# Patient Record
Sex: FEMALE | URBAN - METROPOLITAN AREA
[De-identification: names, ages, dates, MRNs, and addresses within clinical notes are randomized per-mention and may not be internally consistent; named-entity substitution may affect disease eponyms.]

---

## 2021-08-20 ENCOUNTER — APPOINTMENT (RX ONLY)
Dept: URBAN - METROPOLITAN AREA CLINIC 41 | Facility: CLINIC | Age: 61
Setting detail: DERMATOLOGY
End: 2021-08-20

## 2021-08-20 DIAGNOSIS — D22 MELANOCYTIC NEVI: ICD-10-CM

## 2021-08-20 DIAGNOSIS — L57.0 ACTINIC KERATOSIS: ICD-10-CM

## 2021-08-20 PROBLEM — D23.72 OTHER BENIGN NEOPLASM OF SKIN OF LEFT LOWER LIMB, INCLUDING HIP: Status: ACTIVE | Noted: 2021-08-20

## 2021-08-20 PROBLEM — D22.5 MELANOCYTIC NEVI OF TRUNK: Status: ACTIVE | Noted: 2021-08-20

## 2021-08-20 PROCEDURE — 17003 DESTRUCT PREMALG LES 2-14: CPT

## 2021-08-20 PROCEDURE — 99203 OFFICE O/P NEW LOW 30 MIN: CPT | Mod: 25

## 2021-08-20 PROCEDURE — ? LIQUID NITROGEN

## 2021-08-20 PROCEDURE — 17000 DESTRUCT PREMALG LESION: CPT

## 2021-08-20 PROCEDURE — ? COUNSELING

## 2021-08-20 ASSESSMENT — LOCATION SIMPLE DESCRIPTION DERM
LOCATION SIMPLE: RIGHT LOWER BACK
LOCATION SIMPLE: NOSE
LOCATION SIMPLE: LEFT UPPER BACK
LOCATION SIMPLE: RIGHT LIP

## 2021-08-20 ASSESSMENT — LOCATION ZONE DERM
LOCATION ZONE: TRUNK
LOCATION ZONE: LIP
LOCATION ZONE: NOSE

## 2021-08-20 ASSESSMENT — LOCATION DETAILED DESCRIPTION DERM
LOCATION DETAILED: RIGHT UPPER CUTANEOUS LIP
LOCATION DETAILED: LEFT SUPERIOR UPPER BACK
LOCATION DETAILED: RIGHT SUPERIOR LATERAL MIDBACK
LOCATION DETAILED: NASAL DORSUM

## 2021-08-20 NOTE — PROCEDURE: COUNSELING
Detail Level: Zone
Sunscreen Recommendations: Discussed importance of continual sun protection with sun screen and photo protective clothing
Detail Level: Simple
Sunscreen Recommendations: Mineral based sunscreens containing zinc and titanium
Detail Level: Detailed

## 2021-08-20 NOTE — PROCEDURE: LIQUID NITROGEN
Render Post-Care Instructions In Note?: no
Duration Of Freeze Thaw-Cycle (Seconds): 0
Application Tool (Optional): Liquid Nitrogen Sprayer
Show Aperture Variable?: Yes
Post-Care Instructions: Apply Vaseline frequently. WCI given
Detail Level: Detailed
Consent: Verbal consent was obtained and risks were reviewed including, but not limited to, scarring, infection, bleeding, scabbing, and incomplete removal. Discussed wound care instruction